# Patient Record
Sex: FEMALE | Race: WHITE
[De-identification: names, ages, dates, MRNs, and addresses within clinical notes are randomized per-mention and may not be internally consistent; named-entity substitution may affect disease eponyms.]

---

## 2019-10-05 ENCOUNTER — HOSPITAL ENCOUNTER (INPATIENT)
Dept: HOSPITAL 74 - JER | Age: 44
LOS: 1 days | Discharge: LEFT BEFORE DISCHARGE | DRG: 918 | End: 2019-10-06
Attending: INTERNAL MEDICINE | Admitting: INTERNAL MEDICINE
Payer: COMMERCIAL

## 2019-10-05 VITALS — BODY MASS INDEX: 20.9 KG/M2

## 2019-10-05 DIAGNOSIS — E87.1: ICD-10-CM

## 2019-10-05 DIAGNOSIS — T43.294A: Primary | ICD-10-CM

## 2019-10-05 DIAGNOSIS — F32.9: ICD-10-CM

## 2019-10-05 DIAGNOSIS — R51: ICD-10-CM

## 2019-10-05 DIAGNOSIS — R55: ICD-10-CM

## 2019-10-05 DIAGNOSIS — Z95.810: ICD-10-CM

## 2019-10-05 DIAGNOSIS — M19.90: ICD-10-CM

## 2019-10-05 DIAGNOSIS — R56.9: ICD-10-CM

## 2019-10-05 DIAGNOSIS — I45.81: ICD-10-CM

## 2019-10-05 DIAGNOSIS — I49.9: ICD-10-CM

## 2019-10-05 LAB
ALBUMIN SERPL-MCNC: 3.6 G/DL (ref 3.4–5)
ALP SERPL-CCNC: 117 U/L (ref 45–117)
ALT SERPL-CCNC: 13 U/L (ref 13–61)
AMPHET UR-MCNC: NEGATIVE NG/ML
ANION GAP SERPL CALC-SCNC: 7 MMOL/L (ref 8–16)
AST SERPL-CCNC: 18 U/L (ref 15–37)
BARBITURATES UR-MCNC: NEGATIVE NG/ML
BASOPHILS # BLD: 0.5 % (ref 0–2)
BENZODIAZ UR SCN-MCNC: NEGATIVE NG/ML
BILIRUB SERPL-MCNC: 0.2 MG/DL (ref 0.2–1)
BUN SERPL-MCNC: 11.8 MG/DL (ref 7–18)
CALCIUM SERPL-MCNC: 7.9 MG/DL (ref 8.5–10.1)
CHLORIDE SERPL-SCNC: 99 MMOL/L (ref 98–107)
CO2 SERPL-SCNC: 28 MMOL/L (ref 21–32)
COCAINE UR-MCNC: NEGATIVE NG/ML
CREAT SERPL-MCNC: 0.8 MG/DL (ref 0.55–1.3)
DEPRECATED RDW RBC AUTO: 13.4 % (ref 11.6–15.6)
EOSINOPHIL # BLD: 0 % (ref 0–4.5)
GLUCOSE SERPL-MCNC: 121 MG/DL (ref 74–106)
HCT VFR BLD CALC: 32.5 % (ref 32.4–45.2)
HGB BLD-MCNC: 11 GM/DL (ref 10.7–15.3)
INR BLD: 0.98 (ref 0.83–1.09)
LYMPHOCYTES # BLD: 13.9 % (ref 8–40)
MAGNESIUM SERPL-MCNC: 2.1 MG/DL (ref 1.8–2.4)
MCH RBC QN AUTO: 30 PG (ref 25.7–33.7)
MCHC RBC AUTO-ENTMCNC: 33.9 G/DL (ref 32–36)
MCV RBC: 88.4 FL (ref 80–96)
METHADONE UR-MCNC: NEGATIVE NG/ML
MONOCYTES # BLD AUTO: 6.7 % (ref 3.8–10.2)
NEUTROPHILS # BLD: 78.9 % (ref 42.8–82.8)
OPIATES UR QL SCN: NEGATIVE NG/ML
PCP UR QL SCN: NEGATIVE NG/ML
PH UR: 7 [PH] (ref 5–8)
PLATELET # BLD AUTO: 238 K/MM3 (ref 134–434)
PMV BLD: 8 FL (ref 7.5–11.1)
POTASSIUM SERPLBLD-SCNC: 4.1 MMOL/L (ref 3.5–5.1)
PROT SERPL-MCNC: 6.6 G/DL (ref 6.4–8.2)
PT PNL PPP: 11.6 SEC (ref 9.7–13)
RBC # BLD AUTO: 3.68 M/MM3 (ref 3.6–5.2)
SODIUM SERPL-SCNC: 134 MMOL/L (ref 136–145)
SP GR UR: 1.02 (ref 1.01–1.03)
UROBILINOGEN UR STRIP-MCNC: 0.2 MG/DL (ref 0.2–1)
WBC # BLD AUTO: 5.8 K/MM3 (ref 4–10)

## 2019-10-05 PROCEDURE — G0378 HOSPITAL OBSERVATION PER HR: HCPCS

## 2019-10-05 NOTE — PDOC
*Physical Exam





- Vital Signs


 Last Vital Signs











Temp Pulse Resp BP Pulse Ox


 


 98.1 F   86   18   134/85   99 


 


 10/05/19 22:09  10/05/19 22:09  10/05/19 22:09  10/05/19 22:09  10/05/19 22:09














ED Treatment Course





- LABORATORY


CBC & Chemistry Diagram: 


 10/05/19 22:15





 10/05/19 22:15





- ADDITIONAL ORDERS


Additional order review: 


 Laboratory  Results











  10/05/19





  22:03


 


POC Glucometer  117








 











  10/05/19





  22:03


 


POC Glucometer  117














- RADIOLOGY


Radiology Studies Ordered: 














 Category Date Time Status


 


 HEAD CT WITHOUT CONTRAST [CT] Stat CT Scan  10/05/19 22:34 Ordered














Medical Decision Making





- Medical Decision Making





10/05/19 22:36


Patient seen and evaluated as pre-attending with Dr. Isaacs (PGY-2) and Dr. Gold (Attending)





44 y/o female with a PMHx of Long QT syndrome (s/p AICD 4 years previous) BIBEM 

after syncopal vs. seizure.  Patient has limited recall of what occured but 

states states she was feeling lightheaded this morning and reports she saw an 

animal with a long tail and a bird in her bedroom.  She next remembers EMS in 

her house. Patient reports she took 4 Wellbutrin pills today to increase her 

energy in addition to her daily home medications of Prozac and Metoprolol.  

Cannot recall the name of the doctor who prescribed her Prozac and Metoprolol 

and states she received her Wellbutrin following an online consultation with a 

doctor.  Subsequently states she receives Metoprolol from a friend.  Does not 

take Wellbutrin daily but PRN for increased energy.  Mother reports that 

patient was c/o being dry all day and then suddenly fell to the ground with 

tonic clonic movements of her UE. 





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  Nursing staff provides 

additional information that patient used to work @ [facility] ICU however was 

terminated for stealing medication. 





VS unremarkable, has facial and UE fasciculation, appears older than stated 

age. 


Concern for cardiac event vs. drug withdrawal seizure


Plan to CT head, interrogate AICD and admit.   





10/05/19 22:53


Call placed to [facility] ED, as per [facility] ED, patient had polymorphic VTach/

Torsades x2 w/AICD ([employer]) placed on 09/11/2015


Patient had initially been evaluated in our ED in 09/04/2015 for VFib w/

subsequent cardiac arrest, EKG showed Prolonged QTc > [facility]





10/06/19 01:13


Patient accepted to hospitalist medicine service for further evaluation.


Clinical Impression: Seizure, likely 2/2 to Buproprion withdrawal





Discharge





- Discharge Information


Problems reviewed: Yes


Clinical Impression/Diagnosis: 


 Syncope and collapse, History of implantable cardioverter-defibrillator (ICD) 

placement, Prolonged QT syndrome





Condition: Stable





- Admission


Yes





- Follow up/Referral





- Patient Discharge Instructions





- Post Discharge Activity

## 2019-10-05 NOTE — PDOC
Attending Attestation





- Resident


Resident Name: Carol Isaacs





- ED Attending Attestation


I have performed the following: I have examined & evaluated the patient, The 

case was reviewed & discussed with the resident, I agree w/resident's findings 

& plan





- HPI


HPI: 





10/05/19 22:39


Pt "passed out" at home; not witnessed by sibs or mom.


Pt has a hx of a defibrillator placed in 2015; pt has no CP at this time


SHe had seizure activity at home. SHe had urinary incontinence.  She takes 

wellbutrin and prozac and she may have caused a decrease in her seizure 

threshold as a result of the wellbutrin





- Physicial Exam


PE: 





10/05/19 23:33


Agree with resident exam.  Pt is shaky; appears to be withdrawing from sedative 

type medication.


Dry mucus menbranes.


Afebrile


Abd soft NT ND


Lungs clear. 


HR S1S2 RRR


Pt is neurologically intact. Answering all questions.


Reflexes equal throughout.





- Medical Decision Making





10/05/19 23:04


Pt's labs are all WNL. Trop is 0.02. CPK is only 100.


Pt may not have had a seizure either.


She is dry mouth and dehydrated and she may have just had a vasovagal syncope.





I called [employer], and they will be calling me back for a 

representative of theirs to query patient's defibrillator.


Her defibrillator is not compatible with the [employer] query sytem thru the 

telephone lines.


10/05/19 23:39


Pt will be admitted to hospitalist to the tele unit





10/06/19 00:10


Patient Name: CHARO CEE


THIS IS A PRELIMINARY REPORT FROM IMAGING ON CALL


DATE OF SERVICE: 2019-10-05 23:33:26


IMAGES: 146


EXAM: HEAD CT WITHOUT CONTRAST


HISTORY: New onset seizure


COMPARISON: None.


FINDINGS:


No acute intracranial abnormalities identified.


No mass.


No obvious infarct.


No hemorrhage.


No hydrocephalus shift or herniation.


Osseous structures are intact.


Advanced mucosal thickening and/or fluid is noted in the bilateral maxillary 

sinuses and in the right


ethmoid sinuses. There is a bubbly pattern in the left maxillary sinus. This 

could be seen with


sinusitis possibly even fungal sinusitis. Recommend evaluation.

## 2019-10-06 VITALS — TEMPERATURE: 98.3 F

## 2019-10-06 VITALS — SYSTOLIC BLOOD PRESSURE: 114 MMHG | DIASTOLIC BLOOD PRESSURE: 70 MMHG | HEART RATE: 78 BPM

## 2019-10-06 LAB
ALBUMIN SERPL-MCNC: 3.1 G/DL (ref 3.4–5)
ALP SERPL-CCNC: 98 U/L (ref 45–117)
ALT SERPL-CCNC: 13 U/L (ref 13–61)
ANION GAP SERPL CALC-SCNC: 6 MMOL/L (ref 8–16)
AST SERPL-CCNC: 13 U/L (ref 15–37)
BASOPHILS # BLD: 0.4 % (ref 0–2)
BILIRUB SERPL-MCNC: 0.4 MG/DL (ref 0.2–1)
BUN SERPL-MCNC: 7.9 MG/DL (ref 7–18)
CALCIUM SERPL-MCNC: 7.9 MG/DL (ref 8.5–10.1)
CHLORIDE SERPL-SCNC: 105 MMOL/L (ref 98–107)
CO2 SERPL-SCNC: 28 MMOL/L (ref 21–32)
CREAT SERPL-MCNC: 0.7 MG/DL (ref 0.55–1.3)
DEPRECATED RDW RBC AUTO: 13.7 % (ref 11.6–15.6)
EOSINOPHIL # BLD: 0 % (ref 0–4.5)
GLUCOSE SERPL-MCNC: 100 MG/DL (ref 74–106)
HCT VFR BLD CALC: 31.1 % (ref 32.4–45.2)
HGB BLD-MCNC: 10.5 GM/DL (ref 10.7–15.3)
LYMPHOCYTES # BLD: 26.1 % (ref 8–40)
MAGNESIUM SERPL-MCNC: 2.1 MG/DL (ref 1.8–2.4)
MCH RBC QN AUTO: 30 PG (ref 25.7–33.7)
MCHC RBC AUTO-ENTMCNC: 33.8 G/DL (ref 32–36)
MCV RBC: 88.7 FL (ref 80–96)
MONOCYTES # BLD AUTO: 10.2 % (ref 3.8–10.2)
NEUTROPHILS # BLD: 63.3 % (ref 42.8–82.8)
PHOSPHATE SERPL-MCNC: 3.6 MG/DL (ref 2.5–4.9)
PLATELET # BLD AUTO: 196 K/MM3 (ref 134–434)
PMV BLD: 8 FL (ref 7.5–11.1)
POTASSIUM SERPLBLD-SCNC: 4.3 MMOL/L (ref 3.5–5.1)
PROT SERPL-MCNC: 5.8 G/DL (ref 6.4–8.2)
RBC # BLD AUTO: 3.5 M/MM3 (ref 3.6–5.2)
SODIUM SERPL-SCNC: 139 MMOL/L (ref 136–145)
WBC # BLD AUTO: 4.6 K/MM3 (ref 4–10)

## 2019-10-06 NOTE — HP
CHIEF COMPLAINT: Seizure





PCP: needs referral


Cardiology: [facility]





HISTORY OF PRESENT ILLNESS: Pt is a 42 yo F, with PMH of prolonged QT (AICD- 

[facility] had polymorphic vtac and torsades, post V fib arrest 2015), who is 

presenting via EMS from home after a seizure episode. The patient does not 

remember the event, but she relayed the story as it was told to her by her 

family members. Patient's family reports she fell to the ground and had 1-2 

minutes of generalized shaking, LOC, and urinary incontinence followed by a 

short period of confusion. Pt has never had a seizure, but states she has been 

taking "about 4 doses of wellbutrin and prozac for an energy boost today," 

which she has been buying online. Pt has not been compliant with her heart 

medications ("taking toprol from a friend") and has not seen cardiology since 

her AICD placement 4 years ago. Pt denies any recent fevers/chills, headache, 

vision changes, chest pain, palpitations, SOB, nausea/vomiting, abdominal pain, 

urinary symptoms, diarrhea/constipation, or leg swelling.








ER course was notable for:


(1)ECG: NSR, prolonged QTc (HR 86, , , QTc 528). No TWIs or 

significant ST segment changes. Prolonged QTc from prior (499, done 9/15/2015)


(2)UA negative for infection. Utox positive for MDMA


(3)Providing 1 g keppra loading dose for concern of long keppra half life; 

concern for possible continued seizure activity. 





In ED: [employer] was called and stated they will send results of AICD 

activity in the morning as the device is not compatible with our interrogation 

system. 





Recent Travel: denies





PAST MEDICAL HISTORY: prolonged QT s/p AICD  ([facility] had polymorphic vtac 

and torsades) post V fib arrest 2015





PAST SURGICAL HISTORY: AICD placement in 2015





Social History:


Smoking: former smoker, wellbutrin to quit now only smokes cigarette 

occasionally (quit 4mo ago). 


Alcohol: denies


Drugs: denies





Allergies





No Known Allergies Allergy (Verified 10/06/19 01:50)


 








HOME MEDICATIONS:


 Home Medications











 Medication  Instructions  Recorded


 


Bupropion HCl [Bupropion Xl] 150 mg PO ONCE 10/06/19


 


Fluoxetine HCl [Prozac] 40 mg PO DAILY 10/06/19








REVIEW OF SYSTEMS


CONSTITUTIONAL: malaise


Absent:  fever, chills, diaphoresis, generalized weakness, , loss of appetite, 

weight change


HEENT: 


Absent:  rhinorrhea, nasal congestion, throat pain, throat swelling, difficulty 

swallowing, mouth swelling, ear pain, eye pain, visual changes


CARDIOVASCULAR: 


Absent: chest pain, syncope, palpitations, irregular heart rate, lightheadedness

, peripheral edema


RESPIRATORY: 


Absent: cough, shortness of breath, dyspnea with exertion, orthopnea, wheezing, 

stridor, hemoptysis


GASTROINTESTINAL: 


Absent: abdominal pain, abdominal distension, nausea, vomiting, diarrhea, 

constipation, melena, hematochezia


GENITOURINARY: incontinence


Absent: dysuria, frequency, urgency, hesitancy, hematuria, flank pain, genital 

pain


MUSCULOSKELETAL: 


Absent: myalgia, arthralgia, joint swelling, back pain, neck pain


SKIN: 


Absent: rash, itching, pallor


HEMATOLOGIC/IMMUNOLOGIC: 


Absent: easy bleeding, easy bruising, lymphadenopathy, frequent infections


ENDOCRINE:


Absent: unexplained weight gain, unexplained weight loss, heat intolerance, 

cold intolerance


NEUROLOGIC: 


Absent: headache, focal weakness or paresthesias, dizziness, unsteady gait, 

seizure, mental status changes, bladder or bowel incontinence


PSYCHIATRIC: 


Absent: anxiety, depression, suicidal or homicidal ideation, hallucinations.








PHYSICAL EXAMINATION


 Vital Signs - 24 hr











  10/05/19 10/06/19 10/06/19





  22:09 03:06 06:11


 


Temperature 98.1 F  


 


Pulse Rate 86  62


 


Respiratory 18  20





Rate   


 


Blood Pressure 134/85  99/56 L


 


O2 Sat by Pulse 99 96 





Oximetry (%)   











GENERAL: Awake, alert, and fully oriented, in no acute distress.


HEAD: Normal with no signs of trauma.


EYES: Pupils equal, round and reactive to light, extraocular movements intact, 

sclera anicteric, conjunctiva clear. No lid lag. Horizontal nystagmus with 

beats to right when looking left. 


EARS, NOSE, THROAT: Ears normal, nares patent, oropharynx clear without 

exudates. Dry mucous membranes, poor dentition with multiple cavities and 

missing teeth towards the back, did NOT bite tongue


NECK: Normal range of motion, supple without lymphadenopathy, JVD, or masses.


LUNGS: Breath sounds equal, clear to auscultation bilaterally. No wheezes, and 

no crackles. No accessory muscle use.


HEART: Regular rate and rhythm, normal S1 and S2 without murmur, rub or gallop. 

L side AICD in place. 


ABDOMEN: Soft, nontender, not distended, normoactive bowel sounds, no guarding, 

no rebound, no masses.  No hepatomegaly or  splenomegaly. 


MUSCULOSKELETAL: Normal range of motion at all joints. No bony deformities or 

tenderness. No CVA tenderness.


UPPER EXTREMITIES: 2+ pulses, warm, well-perfused. No cyanosis. No clubbing. No 

peripheral edema. sensation intact bilaterally, 1+ reflexes


LOWER EXTREMITIES: 2+ pulses, warm, well-perfused. No calf tenderness. No 

peripheral edema. Sensation intact bilaterally, hyperreflexia bilaterally


NEUROLOGICAL:  Cranial nerves II-XII intact. Normal speech. Normal gait.


PSYCHIATRIC: Cooperative. Good eye contact. Appropriate mood and affect.


SKIN: Warm, dry, normal turgor, no rashes or lesions noted, normal capillary 

refill. 





 Laboratory Results - last 24 hr











  10/05/19 10/05/19 10/05/19





  22:03 22:15 22:15


 


WBC    5.8


 


RBC    3.68


 


Hgb    11.0


 


Hct    32.5


 


MCV    88.4


 


MCH    30.0


 


MCHC    33.9


 


RDW    13.4


 


Plt Count    238  D


 


MPV    8.0


 


Absolute Neuts (auto)    4.5


 


Neutrophils %    78.9


 


Lymphocytes %    13.9  D


 


Monocytes %    6.7


 


Eosinophils %    0.0  D


 


Basophils %    0.5


 


Nucleated RBC %    0


 


PT with INR   


 


INR   


 


Sodium   


 


Potassium   


 


Chloride   


 


Carbon Dioxide   


 


Anion Gap   


 


BUN   


 


Creatinine   


 


Est GFR (CKD-EPI)AfAm   


 


Est GFR (CKD-EPI)NonAf   


 


POC Glucometer  117  


 


Random Glucose   


 


Calcium   


 


Magnesium   


 


Total Bilirubin   


 


AST   


 


ALT   


 


Alkaline Phosphatase   


 


Creatine Kinase   102 


 


Troponin I   < 0.02 


 


Total Protein   


 


Albumin   


 


Serum Pregnancy, Qual   


 


Urine Color   


 


Urine Appearance   


 


Urine pH   


 


Ur Specific Gravity   


 


Urine Protein   


 


Urine Glucose (UA)   


 


Urine Ketones   


 


Urine Blood   


 


Urine Nitrite   


 


Urine Bilirubin   


 


Urine Urobilinogen   


 


Ur Leukocyte Esterase   


 


Salicylates   


 


Opiates Screen   


 


Methadone Screen   


 


Acetaminophen   


 


Barbiturate Screen   


 


Phencyclidine Screen   


 


Ur Amphetamines Screen   


 


MDMA (Ecstasy) Screen   


 


Benzodiazepines Screen   


 


Cocaine Screen   


 


U Marijuana (THC) Screen   


 


Alcohol, Quantitative   














  10/05/19 10/05/19 10/05/19





  22:15 22:15 22:15


 


WBC   


 


RBC   


 


Hgb   


 


Hct   


 


MCV   


 


MCH   


 


MCHC   


 


RDW   


 


Plt Count   


 


MPV   


 


Absolute Neuts (auto)   


 


Neutrophils %   


 


Lymphocytes %   


 


Monocytes %   


 


Eosinophils %   


 


Basophils %   


 


Nucleated RBC %   


 


PT with INR   11.60 


 


INR   0.98 


 


Sodium  134 L  


 


Potassium  4.1  


 


Chloride  99  


 


Carbon Dioxide  28  


 


Anion Gap  7 L  


 


BUN  11.8  


 


Creatinine  0.8  


 


Est GFR (CKD-EPI)AfAm  106.13  


 


Est GFR (CKD-EPI)NonAf  91.57  


 


POC Glucometer   


 


Random Glucose  121 H  


 


Calcium  7.9 L  


 


Magnesium  2.1  


 


Total Bilirubin  0.2  


 


AST  18  


 


ALT  13  


 


Alkaline Phosphatase  117  


 


Creatine Kinase   


 


Troponin I   


 


Total Protein  6.6  


 


Albumin  3.6  


 


Serum Pregnancy, Qual    Negative


 


Urine Color   


 


Urine Appearance   


 


Urine pH   


 


Ur Specific Gravity   


 


Urine Protein   


 


Urine Glucose (UA)   


 


Urine Ketones   


 


Urine Blood   


 


Urine Nitrite   


 


Urine Bilirubin   


 


Urine Urobilinogen   


 


Ur Leukocyte Esterase   


 


Salicylates   


 


Opiates Screen   


 


Methadone Screen   


 


Acetaminophen   


 


Barbiturate Screen   


 


Phencyclidine Screen   


 


Ur Amphetamines Screen   


 


MDMA (Ecstasy) Screen   


 


Benzodiazepines Screen   


 


Cocaine Screen   


 


U Marijuana (THC) Screen   


 


Alcohol, Quantitative   














  10/05/19 10/05/19 10/06/19





  22:55 22:55 01:05


 


WBC   


 


RBC   


 


Hgb   


 


Hct   


 


MCV   


 


MCH   


 


MCHC   


 


RDW   


 


Plt Count   


 


MPV   


 


Absolute Neuts (auto)   


 


Neutrophils %   


 


Lymphocytes %   


 


Monocytes %   


 


Eosinophils %   


 


Basophils %   


 


Nucleated RBC %   


 


PT with INR   


 


INR   


 


Sodium   


 


Potassium   


 


Chloride   


 


Carbon Dioxide   


 


Anion Gap   


 


BUN   


 


Creatinine   


 


Est GFR (CKD-EPI)AfAm   


 


Est GFR (CKD-EPI)NonAf   


 


POC Glucometer   


 


Random Glucose   


 


Calcium   


 


Magnesium   


 


Total Bilirubin   


 


AST   


 


ALT   


 


Alkaline Phosphatase   


 


Creatine Kinase   


 


Troponin I   


 


Total Protein   


 


Albumin   


 


Serum Pregnancy, Qual   


 


Urine Color   Yellow 


 


Urine Appearance   Clear 


 


Urine pH   7.0 


 


Ur Specific Gravity   1.020 


 


Urine Protein   Negative 


 


Urine Glucose (UA)   Negative 


 


Urine Ketones   Negative 


 


Urine Blood   Negative 


 


Urine Nitrite   Negative 


 


Urine Bilirubin   Negative 


 


Urine Urobilinogen   0.2 


 


Ur Leukocyte Esterase   Trace 


 


Salicylates    < 1.7 L


 


Opiates Screen  Negative  


 


Methadone Screen  Negative  


 


Acetaminophen   


 


Barbiturate Screen  Negative  


 


Phencyclidine Screen  Negative  


 


Ur Amphetamines Screen  Negative  


 


MDMA (Ecstasy) Screen  Positive A*  


 


Benzodiazepines Screen  Negative  


 


Cocaine Screen  Negative  


 


U Marijuana (THC) Screen  Negative  


 


Alcohol, Quantitative   














  10/06/19 10/06/19





  01:05 01:05


 


WBC  


 


RBC  


 


Hgb  


 


Hct  


 


MCV  


 


MCH  


 


MCHC  


 


RDW  


 


Plt Count  


 


MPV  


 


Absolute Neuts (auto)  


 


Neutrophils %  


 


Lymphocytes %  


 


Monocytes %  


 


Eosinophils %  


 


Basophils %  


 


Nucleated RBC %  


 


PT with INR  


 


INR  


 


Sodium  


 


Potassium  


 


Chloride  


 


Carbon Dioxide  


 


Anion Gap  


 


BUN  


 


Creatinine  


 


Est GFR (CKD-EPI)AfAm  


 


Est GFR (CKD-EPI)NonAf  


 


POC Glucometer  


 


Random Glucose  


 


Calcium  


 


Magnesium  


 


Total Bilirubin  


 


AST  


 


ALT  


 


Alkaline Phosphatase  


 


Creatine Kinase  


 


Troponin I  


 


Total Protein  


 


Albumin  


 


Serum Pregnancy, Qual  


 


Urine Color  


 


Urine Appearance  


 


Urine pH  


 


Ur Specific Gravity  


 


Urine Protein  


 


Urine Glucose (UA)  


 


Urine Ketones  


 


Urine Blood  


 


Urine Nitrite  


 


Urine Bilirubin  


 


Urine Urobilinogen  


 


Ur Leukocyte Esterase  


 


Salicylates  


 


Opiates Screen  


 


Methadone Screen  


 


Acetaminophen  <2.0 


 


Barbiturate Screen  


 


Phencyclidine Screen  


 


Ur Amphetamines Screen  


 


MDMA (Ecstasy) Screen  


 


Benzodiazepines Screen  


 


Cocaine Screen  


 


U Marijuana (THC) Screen  


 


Alcohol, Quantitative   < 3.0








Imaging:


CT head negative for masses, bleeds.


Chest x-ray clear with no infiltrates or cardiomegaly





ASSESSMENT/PLAN:


Pt is a 42 yo F, with PMH of prolonged QT (AICD- [facility] had polymorphic vtac 

and torsades, post V fib arrest 2015), who is presenting via EMS from home 

after a seizure episode. 





#Seizure


-telemetry 


- patient counselled on the dangers of ordering medications from the internet, 

or taking medicines that belong to friends. Counseled on avoiding drugs like 

MDMA.


-avoid welbutrin 


-monitor electrolytes 


-iv fluid hdyration 


-neuro consult





#Prolonged qtc syndrome s/p aicd - noncompliant with follow up care 


-cardiology consult for aicd interrogation 


-avoid qtc prolonging agents 





#Hyponatremia


-iv fluid hydration 


-repeat chemistry 





dvt -scds   








Visit type





- Emergency Visit


Emergency Visit: Yes


ED Registration Date: 10/06/19


Care time: The patient presented to the Emergency Department on the above date 

and was hospitalized for further evaluation of their emergent condition.





- New Patient


This patient is new to me today: Yes


Date on this admission: 10/06/19





- Critical Care


Critical Care patient: No





ATTENDING PHYSICIAN STATEMENT





I saw and evaluated the patient.


I reviewed the resident's note and discussed the case with the resident.


I agree with the resident's findings and plan as documented.








SUBJECTIVE:








OBJECTIVE:








ASSESSMENT AND PLAN:

## 2019-10-06 NOTE — PN
Teaching Attending Note


Name of Resident: Jihan Olivarez





ATTENDING PHYSICIAN STATEMENT





I saw and evaluated the patient.


I reviewed the resident's note and discussed the case with the resident.


I agree with the resident's findings and plan as documented.








SUBJECTIVE:


42yo woman with long qt syndrome s/p cardiac arrest and aicd placement in 2015, 

no cardiology f/u has been taking prozac and recently started taking Welbutrin 

to help her quit smoking. She reported taking 3 tabs of 150mg Welbutrin 

yesterday, along with her Prozac. She  had aura of animals in her room, LOC 

around evening time, woke up confused, having urinated herself. Denied any 

headache. Unspecified time LOC. She denied taking ecstasy although her utox was 

positive for it. 





OBJECTIVE:


 Last Vital Signs











Temp Pulse Resp BP Pulse Ox


 


 98.1 F   86   18   134/85   99 


 


 10/05/19 22:09  10/05/19 22:09  10/05/19 22:09  10/05/19 22:09  10/05/19 22:09





gen -nad, aaox3 


heent  -atraumatic, perrla , no tongue biting 


neck supple 


cv-s1+s2+ rrr


chest left sided aicd in place 


ext - no pedal edema 





 Abnormal Lab Results











  10/05/19 10/05/19





  22:15 22:55


 


Sodium  134 L 


 


Anion Gap  7 L 


 


Random Glucose  121 H 


 


Calcium  7.9 L 


 


MDMA (Ecstasy) Screen   Positive A*








ekg showed long qtc 


ct- no acute insults 








ASSESSMENT AND PLAN:


#S/p seizure episode x1, likely induced by high dose welbutrin + ecstasy. First 

time seizure episode. head ct wnl. 


-telemetry 


-avoid ecstasy 


-avoid welbutrin 


-monitor electrolytes 


-iv fluid hdyration 


-neuro eval 





#Prolonged qtc syndrome s/p aicd - noncompliant w/ f/u 


-cardiology consult for aicd interrogation 


-avoid qtc prolonging agents 





#Hyponatremia- may be secondary to ecstasy , siadh 


-iv fluid hydration 


-repeat chemistry 





dvt -scds

## 2019-10-06 NOTE — EKG
Test Reason : 

Blood Pressure : ***/*** mmHG

Vent. Rate : 064 BPM     Atrial Rate : 064 BPM

   P-R Int : 168 ms          QRS Dur : 092 ms

    QT Int : 502 ms       P-R-T Axes : 054 059 052 degrees

   QTc Int : 517 ms

 

NORMAL SINUS RHYTHM

PROLONGED QT

ABNORMAL ECG

WHEN COMPARED WITH ECG OF 05-OCT-2019 22:11,

T WAVE INVERSION NO LONGER EVIDENT IN ANTERIOR LEADS

Confirmed by JOSEPHINE DURAN, KATHI ([id number]) on 10/6/2019 11:26:43 AM

 

Referred By: MARIAMA SMITH           Confirmed By:KATHI BURTON MD

## 2019-10-06 NOTE — PN
Progress Note (short form)





- Note


Progress Note: 





Subjective:


no fever or chills. denies any CP or SOB. denies dizziness. she reports feeling 

dizzy ( can't specify ) before her LOC. Also, she saw a bird that no one else 

saw before she passed out. she woke up wet. not clear of duration of LOC. 

denies taking Ecstasy but thinks the positive urine is frm a tea that she takes 

( [place] she does not  provide the name of the tea  








Objective:








Vital Signs:





 Last Vital Signs











Temp Pulse Resp BP Pulse Ox


 


 98.1 F   62   20   99/56 L  96 


 


 10/05/19 22:09  10/06/19 06:11  10/06/19 06:11  10/06/19 06:11  10/06/19 03:06








 Laboratory Results - last 24 hr











  10/05/19 10/05/19 10/05/19





  22:03 22:15 22:15


 


WBC    5.8


 


RBC    3.68


 


Hgb    11.0


 


Hct    32.5


 


MCV    88.4


 


MCH    30.0


 


MCHC    33.9


 


RDW    13.4


 


Plt Count    238  D


 


MPV    8.0


 


Absolute Neuts (auto)    4.5


 


Neutrophils %    78.9


 


Lymphocytes %    13.9  D


 


Monocytes %    6.7


 


Eosinophils %    0.0  D


 


Basophils %    0.5


 


Nucleated RBC %    0


 


PT with INR   


 


INR   


 


Sodium   


 


Potassium   


 


Chloride   


 


Carbon Dioxide   


 


Anion Gap   


 


BUN   


 


Creatinine   


 


Est GFR (CKD-EPI)AfAm   


 


Est GFR (CKD-EPI)NonAf   


 


POC Glucometer  117  


 


Random Glucose   


 


Calcium   


 


Magnesium   


 


Total Bilirubin   


 


AST   


 


ALT   


 


Alkaline Phosphatase   


 


Creatine Kinase   102 


 


Troponin I   < 0.02 


 


Total Protein   


 


Albumin   


 


Serum Pregnancy, Qual   


 


Urine Color   


 


Urine Appearance   


 


Urine pH   


 


Ur Specific Gravity   


 


Urine Protein   


 


Urine Glucose (UA)   


 


Urine Ketones   


 


Urine Blood   


 


Urine Nitrite   


 


Urine Bilirubin   


 


Urine Urobilinogen   


 


Ur Leukocyte Esterase   


 


Salicylates   


 


Opiates Screen   


 


Methadone Screen   


 


Acetaminophen   


 


Barbiturate Screen   


 


Phencyclidine Screen   


 


Ur Amphetamines Screen   


 


MDMA (Ecstasy) Screen   


 


Benzodiazepines Screen   


 


Cocaine Screen   


 


U Marijuana (THC) Screen   


 


Alcohol, Quantitative   














  10/05/19 10/05/19 10/05/19





  22:15 22:15 22:15


 


WBC   


 


RBC   


 


Hgb   


 


Hct   


 


MCV   


 


MCH   


 


MCHC   


 


RDW   


 


Plt Count   


 


MPV   


 


Absolute Neuts (auto)   


 


Neutrophils %   


 


Lymphocytes %   


 


Monocytes %   


 


Eosinophils %   


 


Basophils %   


 


Nucleated RBC %   


 


PT with INR   11.60 


 


INR   0.98 


 


Sodium  134 L  


 


Potassium  4.1  


 


Chloride  99  


 


Carbon Dioxide  28  


 


Anion Gap  7 L  


 


BUN  11.8  


 


Creatinine  0.8  


 


Est GFR (CKD-EPI)AfAm  106.13  


 


Est GFR (CKD-EPI)NonAf  91.57  


 


POC Glucometer   


 


Random Glucose  121 H  


 


Calcium  7.9 L  


 


Magnesium  2.1  


 


Total Bilirubin  0.2  


 


AST  18  


 


ALT  13  


 


Alkaline Phosphatase  117  


 


Creatine Kinase   


 


Troponin I   


 


Total Protein  6.6  


 


Albumin  3.6  


 


Serum Pregnancy, Qual    Negative


 


Urine Color   


 


Urine Appearance   


 


Urine pH   


 


Ur Specific Gravity   


 


Urine Protein   


 


Urine Glucose (UA)   


 


Urine Ketones   


 


Urine Blood   


 


Urine Nitrite   


 


Urine Bilirubin   


 


Urine Urobilinogen   


 


Ur Leukocyte Esterase   


 


Salicylates   


 


Opiates Screen   


 


Methadone Screen   


 


Acetaminophen   


 


Barbiturate Screen   


 


Phencyclidine Screen   


 


Ur Amphetamines Screen   


 


MDMA (Ecstasy) Screen   


 


Benzodiazepines Screen   


 


Cocaine Screen   


 


U Marijuana (THC) Screen   


 


Alcohol, Quantitative   














  10/05/19 10/05/19 10/06/19





  22:55 22:55 01:05


 


WBC   


 


RBC   


 


Hgb   


 


Hct   


 


MCV   


 


MCH   


 


MCHC   


 


RDW   


 


Plt Count   


 


MPV   


 


Absolute Neuts (auto)   


 


Neutrophils %   


 


Lymphocytes %   


 


Monocytes %   


 


Eosinophils %   


 


Basophils %   


 


Nucleated RBC %   


 


PT with INR   


 


INR   


 


Sodium   


 


Potassium   


 


Chloride   


 


Carbon Dioxide   


 


Anion Gap   


 


BUN   


 


Creatinine   


 


Est GFR (CKD-EPI)AfAm   


 


Est GFR (CKD-EPI)NonAf   


 


POC Glucometer   


 


Random Glucose   


 


Calcium   


 


Magnesium   


 


Total Bilirubin   


 


AST   


 


ALT   


 


Alkaline Phosphatase   


 


Creatine Kinase   


 


Troponin I   


 


Total Protein   


 


Albumin   


 


Serum Pregnancy, Qual   


 


Urine Color   Yellow 


 


Urine Appearance   Clear 


 


Urine pH   7.0 


 


Ur Specific Gravity   1.020 


 


Urine Protein   Negative 


 


Urine Glucose (UA)   Negative 


 


Urine Ketones   Negative 


 


Urine Blood   Negative 


 


Urine Nitrite   Negative 


 


Urine Bilirubin   Negative 


 


Urine Urobilinogen   0.2 


 


Ur Leukocyte Esterase   Trace 


 


Salicylates    < 1.7 L


 


Opiates Screen  Negative  


 


Methadone Screen  Negative  


 


Acetaminophen   


 


Barbiturate Screen  Negative  


 


Phencyclidine Screen  Negative  


 


Ur Amphetamines Screen  Negative  


 


MDMA (Ecstasy) Screen  Positive A*  


 


Benzodiazepines Screen  Negative  


 


Cocaine Screen  Negative  


 


U Marijuana (THC) Screen  Negative  


 


Alcohol, Quantitative   














  10/06/19 10/06/19





  01:05 01:05


 


WBC  


 


RBC  


 


Hgb  


 


Hct  


 


MCV  


 


MCH  


 


MCHC  


 


RDW  


 


Plt Count  


 


MPV  


 


Absolute Neuts (auto)  


 


Neutrophils %  


 


Lymphocytes %  


 


Monocytes %  


 


Eosinophils %  


 


Basophils %  


 


Nucleated RBC %  


 


PT with INR  


 


INR  


 


Sodium  


 


Potassium  


 


Chloride  


 


Carbon Dioxide  


 


Anion Gap  


 


BUN  


 


Creatinine  


 


Est GFR (CKD-EPI)AfAm  


 


Est GFR (CKD-EPI)NonAf  


 


POC Glucometer  


 


Random Glucose  


 


Calcium  


 


Magnesium  


 


Total Bilirubin  


 


AST  


 


ALT  


 


Alkaline Phosphatase  


 


Creatine Kinase  


 


Troponin I  


 


Total Protein  


 


Albumin  


 


Serum Pregnancy, Qual  


 


Urine Color  


 


Urine Appearance  


 


Urine pH  


 


Ur Specific Gravity  


 


Urine Protein  


 


Urine Glucose (UA)  


 


Urine Ketones  


 


Urine Blood  


 


Urine Nitrite  


 


Urine Bilirubin  


 


Urine Urobilinogen  


 


Ur Leukocyte Esterase  


 


Salicylates  


 


Opiates Screen  


 


Methadone Screen  


 


Acetaminophen  <2.0 


 


Barbiturate Screen  


 


Phencyclidine Screen  


 


Ur Amphetamines Screen  


 


MDMA (Ecstasy) Screen  


 


Benzodiazepines Screen  


 


Cocaine Screen  


 


U Marijuana (THC) Screen  


 


Alcohol, Quantitative   < 3.0











Physical Exam:


NAD, awake, alert. cooperative. 


MMM, no LAP in neck . slightly ertyhematous oropharynx. no edema in uvula. 


CV: RRR, no MRG 


LUngs: CTAB


Abd: soft, NT, ND , NL BS . reducible small umbilical hernia ( 1 cm ) 


Ext: No edema or erythema, no track marks. DP and RP 2+ b/l. no rash.


AICS felt under the skin in L lower posterior axillary area 





Imaging:


Cxray  reviewed. CT head report pending read 


EKG: sinus , QTC. 500, Nl Axis, No ST or TW changes 





Assessment/Plan:





42 y/o lady with h/o  depression, Long QTC, s/p V Fib arrest in 2015. s/p AICD 

placement, who presented with LOC. 





1- LOC: suspected seizure activity ( aura, urinary incontinence, LOC  in 

setting of positive ecstasy in urine and wellbutrin over dose). No reported 

shocks per patient before  LOC. still need to r/o  arrhythmias. 


- Nl neuro exam.


- obtain orthostatic VS  


- hold off any AED 


- tele monitoring ( no events over night ) 


- ICD interrogation: d/w Tech, No shocks  since placement 


- IVF. 


- order EEG 


- neuro eval. ? MRI  





2- + Ecstasy in urine: patient denies use. Literature searched. Wellbutrin and 

prozac can cause false positive esctasy testing.  


- Monitor on tele 


- no LFTs abnormalities, No tachycardia, no severe HTN. 


- Sodium is slightly low. Ecstacy use can cause hyponatremia. monitor 





3- Wellbutrin over dose: she does not remember the time of ingestion ( in 

evening last night ) . doubt that seizures were caused by this ingestion. 


- I called poison control center. 24 hour tele from last ingestion recommended 

. 


- activated charcoal x 1 is recommended ( 1g/kg)


- will check CMP and electrolytes this am 





4- H/o depression. 


- was started on prozac a month ago. will hold todays dose, and probably cont 

tomorrow 


- will dc wellbutrin ( for smoking) 








5- DVT PX: lovenox 





Visit type





- Emergency Visit


Emergency Visit: Yes


ED Registration Date: 10/06/19


Care time: The patient presented to the Emergency Department on the above date 

and was hospitalized for further evaluation of their emergent condition.





- New Patient


This patient is new to me today: Yes


Date on this admission: 10/06/19





- Critical Care


Critical Care patient: No

## 2019-10-06 NOTE — CON.NEURO
Consult


Consult Specialty:: Elvin


Referred by:: ER


Reason for Consultation:: Seizure





- History of Present Illness


History of Present Illness: 





43 years old woman with hx of 


Cardiac Arrhythmia 


OA 


Headache 


Depression 





Came in afetr she had a seizure 


Patient with ext cardiac hx s/po Cardiac arrest 


I came to see [facility] patient to find she was signed out AMA 








- History Source


History Provided By: Medical Record


Limitations to Obtaining History: Uncooperative





- Past Medical History


Cardio/Vascular: Yes: Other (ventricular fibrillation)


...Pregnant: No





- Past Surgical History


Past Surgical History: Yes: AICD





- Alcohol/Substance Use


Hx Alcohol Use: No


History of Substance Use: reports: None





- Smoking History


Smoking history: Former smoker


Have you smoked in the past 12 months: No


Aproximately how many cigarettes per day: 1


If you are a former smoker, when did you quit?: 6 years ago





- Social History


ADL: Independent


History of Recent Travel: No





Home Medications





- Allergies


Allergies/Adverse Reactions: 


 Allergies











Allergy/AdvReac Type Severity Reaction Status Date / Time


 


No Known Allergies Allergy   Verified 10/06/19 01:50














- Home Medications


Home Medications: 


Ambulatory Orders





Fluoxetine HCl [Prozac] 40 mg PO DAILY 10/06/19 











Family Medical History


Family History: Unable to Obtain





Physical Exam-Neuro


Vital Signs: 


 Vital Signs











Temperature  98.3 F   10/06/19 08:31


 


Pulse Rate  78   10/06/19 08:36


 


Respiratory Rate  18   10/06/19 08:31


 


Blood Pressure  114/70   10/06/19 08:36


 


O2 Sat by Pulse Oximetry (%)  97   10/06/19 08:21











Labs: 


 CBC, BMP





 10/06/19 08:50 





 10/06/19 08:50 





 INR, PTT











INR  0.98  (0.83-1.09)   10/05/19  22:15    














Imaging





- Results


Cat Scan: Image Reviewed





Problem List





- Problems


(1) Syncope and collapse


Assessment/Plan: 


?? New onset Seiuzre 


Patient signed out AMA 


Need 


MRI brain with no Rudi 


EEG 


No AEd 


Seizure precaution 


I called [facility] patient cell with no answer 





Thanks 





Bren Oconnor MD 


Code(s): R55 - SYNCOPE AND COLLAPSE

## 2019-10-06 NOTE — EKG
Test Reason : 

Blood Pressure : ***/*** mmHG

Vent. Rate : 086 BPM     Atrial Rate : 086 BPM

   P-R Int : 186 ms          QRS Dur : 110 ms

    QT Int : 442 ms       P-R-T Axes : 059 049 054 degrees

   QTc Int : 528 ms

 

 

NORMAL SINUS RHYTHM

PROLONGED QT

RIGHT BUNDLE BRANCH BLOCK

NONSPECIFIC ST ABNORMALITY

ABNORMAL ECG

 

Confirmed by KATHI BURTON MD ([id number]) on 10/6/2019 11:32:17 AM

 

Referred By:             Confirmed By:KATHI BURTON MD

## 2019-10-07 NOTE — DS
Physical Exam: 


SUBJECTIVE:no fever or chills. denies any CP or SOB. denies dizziness. she 

reports feeling dizzy ( can't specify ) before her LOC. Also, she saw a bird 

that no one else saw before she passed out. she woke up wet. not clear of 

duration of LOC. denies taking Ecstasy but thinks the positive urine is frm a 

tea that she takes ( [place] she does not  provide the name 

of the tea  








OBJECTIVE:


 Last Vital Signs











Temp Pulse Resp BP Pulse Ox


 


 98.3 F   78   18   114/70   97 


 


 10/06/19 08:31  10/06/19 08:36  10/06/19 08:31  10/06/19 08:36  10/06/19 08:21














PHYSICAL EXAM





Physical Exam:


NAD, awake, alert. cooperative. 


MMM, no LAP in neck . slightly ertyhematous oropharynx. no edema in uvula. 


CV: RRR, no MRG 


LUngs: CTAB


Abd: soft, NT, ND , NL BS . reducible small umbilical hernia ( 1 cm ) 


Ext: No edema or erythema, no track marks. DP and RP 2+ b/l. no rash.


AICS felt under the skin in L lower posterior axillary area 











LABS


CBC,CMP











WBC  4.6 K/mm3 (4.0-10.0)   10/06/19  08:50    


 


RBC  3.50 M/mm3 (3.60-5.2)  L  10/06/19  08:50    


 


Hgb  10.5 GM/dL (10.7-15.3)  L  10/06/19  08:50    


 


Hct  31.1 % (32.4-45.2)  L  10/06/19  08:50    


 


MCV  88.7 fl (80-96)   10/06/19  08:50    


 


MCH  30.0 pg (25.7-33.7)   10/06/19  08:50    


 


MCHC  33.8 g/dl (32.0-36.0)   10/06/19  08:50    


 


RDW  13.7 % (11.6-15.6)   10/06/19  08:50    


 


Plt Count  196 K/MM3 (134-434)   10/06/19  08:50    


 


MPV  8.0 fl (7.5-11.1)   10/06/19  08:50    


 


Absolute Neuts (auto)  2.9 K/mm3 (1.5-8.0)   10/06/19  08:50    


 


Neutrophils %  63.3 % (42.8-82.8)   10/06/19  08:50    


 


Lymphocytes %  26.1 % (8-40)  D 10/06/19  08:50    


 


Monocytes %  10.2 % (3.8-10.2)   10/06/19  08:50    


 


Eosinophils %  0.0 % (0-4.5)   10/06/19  08:50    


 


Basophils %  0.4 % (0-2.0)   10/06/19  08:50    


 


Nucleated RBC %  0 % (0-0)   10/06/19  08:50    


 


Sodium  139 mmol/L (136-145)   10/06/19  08:50    


 


Potassium  4.3 mmol/L (3.5-5.1)   10/06/19  08:50    


 


Chloride  105 mmol/L ()   10/06/19  08:50    


 


Carbon Dioxide  28 mmol/L (21-32)   10/06/19  08:50    


 


Anion Gap  6 MMOL/L (8-16)  L  10/06/19  08:50    


 


BUN  7.9 mg/dL (7-18)   10/06/19  08:50    


 


Creatinine  0.7 mg/dL (0.55-1.3)   10/06/19  08:50    


 


Est GFR (CKD-EPI)AfAm  124.73   10/06/19  08:50    


 


Est GFR (CKD-EPI)NonAf  107.62   10/06/19  08:50    


 


POC Glucometer  117 UNITS ()   10/05/19  22:03    


 


Random Glucose  100 mg/dL ()   10/06/19  08:50    


 


Calcium  7.9 mg/dL (8.5-10.1)  L  10/06/19  08:50    


 


Phosphorus  3.6 mg/dL (2.5-4.9)   10/06/19  08:50    


 


Magnesium  2.1 mg/dL (1.8-2.4)   10/06/19  08:50    


 


Total Bilirubin  0.4 mg/dL (0.2-1)   10/06/19  08:50    


 


AST  13 U/L (15-37)  L  10/06/19  08:50    


 


ALT  13 U/L (13-61)   10/06/19  08:50    


 


Alkaline Phosphatase  98 U/L ()   10/06/19  08:50    


 


Creatine Kinase  102 U/L ()   10/05/19  22:15    


 


Troponin I  < 0.02 ng/ml (0.00-0.05)   10/05/19  22:15    


 


Total Protein  5.8 g/dl (6.4-8.2)  L  10/06/19  08:50    


 


Albumin  3.1 g/dl (3.4-5.0)  L  10/06/19  08:50    


 


Serum Pregnancy, Qual  Negative   10/05/19  22:15    





Imaging:


Cxray  reviewed. CT head report pending read 


EKG: sinus , QTC. 500, Nl Axis, No ST or TW changes 





HOSPITAL COURSE:





Date of Admission:10/06/19





Pt is a 42 yo F, with PMH of prolonged QT (AICD- [facility] had polymorphic vtac 

and torsades, post V fib arrest 2015), who is presenting via EMS from home 

after LOC with symptoms that sounded like a seizure episode (aura, LOC, body 

spasms, urinary incontinence lasting 1-2min followed by state of dze/ confusion 

when the patient came to). Patient admitted to overdosing on wellburtin (which 

she got off an internet consult site) and was also found to have MDMA in her 

urine (though she denied taking it). Her neuro exam was normal and her ICD 

interrogation showed no shocks  since placement. Neuro was consuled and an EEG 

was ordered, the patient was admitted to tele. The patient's labs were 

unremarkable except for slight hyponatremia which may have been due to the 

MDMA. The next morning the patient signed out AMA before neurology could assess 

her. The neurologist attempted to reach the patient by cell phone but was 

unable to contact her. 





Date of Discharge: 10/07/19











Minutes to complete discharge: 40





Discharge Summary


Problems reviewed: Yes


Reason For Visit: LONG QT SYNDROME, SYNCOPE AND COLLAPSE


Condition: Stable





- Instructions


Diet, Activity, Other Instructions: 


- You left against medical advise taking the risks that we talked about


- please stop taking bupropion 


- you can resume your prozac tomorrow 


- I referred you to a heart doctor , Dr. Kim, as you need regualr follow up 

given your cardiac history and AICD placement 


- please follow with neurologist, dr. Nicole , as we have not decided if youhad 

a seizure or not. 


- avoid any drugs and alcohol as well 


- if you have any palpitations, any shortness of breath or hallucinations or if 

your AICD fires, please come back to ER 





- follow up with your pcp in 1-2 days 


 


Referrals: 


Tom Kim MD [Staff Physician] - 1 Week


Narendra Nicole DO [Staff Physician] - 1 Week


Disposition: AGAINST MEDICAL ADVICE





- Home Medications


Comprehensive Discharge Medication List: 


Ambulatory Orders





Fluoxetine HCl [Prozac] 40 mg PO DAILY 10/06/19 








This patient is new to me today: No


Emergency Visit: Yes


ED Registration Date: 10/06/19


Care time: The patient presented to the Emergency Department on the above date 

and was hospitalized for further evaluation of their emergent condition.


Critical Care patient: No





- Discharge Referral


Referred to [facility] No





ATTENDING PHYSICIAN STATEMENT





I saw and evaluated the patient.


I reviewed the resident's note and discussed the case with the resident.


I agree with the resident's findings and plan as documented.








SUBJECTIVE:








OBJECTIVE:








ASSESSMENT AND PLAN:

## 2020-11-24 ENCOUNTER — HOSPITAL ENCOUNTER (INPATIENT)
Dept: HOSPITAL 74 - JER | Age: 45
LOS: 1 days | Discharge: LEFT BEFORE DISCHARGE | DRG: 312 | End: 2020-11-25
Attending: STUDENT IN AN ORGANIZED HEALTH CARE EDUCATION/TRAINING PROGRAM | Admitting: GENERAL ACUTE CARE HOSPITAL
Payer: COMMERCIAL

## 2020-11-24 VITALS — BODY MASS INDEX: 21.6 KG/M2

## 2020-11-24 DIAGNOSIS — Z95.810: ICD-10-CM

## 2020-11-24 DIAGNOSIS — E87.6: ICD-10-CM

## 2020-11-24 DIAGNOSIS — R55: Primary | ICD-10-CM

## 2020-11-24 DIAGNOSIS — F32.9: ICD-10-CM

## 2020-11-24 DIAGNOSIS — I45.81: ICD-10-CM

## 2020-11-24 DIAGNOSIS — Z91.14: ICD-10-CM

## 2020-11-24 DIAGNOSIS — Z53.29: ICD-10-CM

## 2020-11-24 DIAGNOSIS — F17.200: ICD-10-CM

## 2020-11-24 LAB
ALBUMIN SERPL-MCNC: 4 G/DL (ref 3.4–5)
ALP SERPL-CCNC: 78 U/L (ref 45–117)
ALT SERPL-CCNC: 16 U/L (ref 13–61)
ANION GAP SERPL CALC-SCNC: 3 MMOL/L (ref 8–16)
ANION GAP SERPL CALC-SCNC: 8 MMOL/L (ref 8–16)
AST SERPL-CCNC: 15 U/L (ref 15–37)
BASOPHILS # BLD: 0.6 % (ref 0–2)
BILIRUB SERPL-MCNC: 0.4 MG/DL (ref 0.2–1)
BUN SERPL-MCNC: 5.9 MG/DL (ref 7–18)
BUN SERPL-MCNC: 8.3 MG/DL (ref 7–18)
CALCIUM SERPL-MCNC: 8.2 MG/DL (ref 8.5–10.1)
CALCIUM SERPL-MCNC: 8.6 MG/DL (ref 8.5–10.1)
CHLORIDE SERPL-SCNC: 105 MMOL/L (ref 98–107)
CHLORIDE SERPL-SCNC: 107 MMOL/L (ref 98–107)
CO2 SERPL-SCNC: 27 MMOL/L (ref 21–32)
CO2 SERPL-SCNC: 27 MMOL/L (ref 21–32)
CREAT SERPL-MCNC: 0.9 MG/DL (ref 0.55–1.3)
CREAT SERPL-MCNC: 1.2 MG/DL (ref 0.55–1.3)
DEPRECATED RDW RBC AUTO: 14 % (ref 11.6–15.6)
EOSINOPHIL # BLD: 1.8 % (ref 0–4.5)
GLUCOSE SERPL-MCNC: 133 MG/DL (ref 74–106)
GLUCOSE SERPL-MCNC: 99 MG/DL (ref 74–106)
HCT VFR BLD CALC: 36 % (ref 32.4–45.2)
HGB BLD-MCNC: 12.4 GM/DL (ref 10.7–15.3)
LYMPHOCYTES # BLD: 8 % (ref 8–40)
MAGNESIUM SERPL-MCNC: 2.4 MG/DL (ref 1.8–2.4)
MCH RBC QN AUTO: 30.6 PG (ref 25.7–33.7)
MCHC RBC AUTO-ENTMCNC: 34.4 G/DL (ref 32–36)
MCV RBC: 89 FL (ref 80–96)
MONOCYTES # BLD AUTO: 4 % (ref 3.8–10.2)
NEUTROPHILS # BLD: 85.6 % (ref 42.8–82.8)
PLATELET # BLD AUTO: 214 K/MM3 (ref 134–434)
PMV BLD: 8.9 FL (ref 7.5–11.1)
POTASSIUM SERPLBLD-SCNC: 2.9 MMOL/L (ref 3.5–5.1)
POTASSIUM SERPLBLD-SCNC: 3.9 MMOL/L (ref 3.5–5.1)
PROT SERPL-MCNC: 6.9 G/DL (ref 6.4–8.2)
RBC # BLD AUTO: 4.04 M/MM3 (ref 3.6–5.2)
SODIUM SERPL-SCNC: 137 MMOL/L (ref 136–145)
SODIUM SERPL-SCNC: 140 MMOL/L (ref 136–145)
WBC # BLD AUTO: 10.4 K/MM3 (ref 4–10)

## 2020-11-24 PROCEDURE — C9803 HOPD COVID-19 SPEC COLLECT: HCPCS

## 2020-11-24 PROCEDURE — U0003 INFECTIOUS AGENT DETECTION BY NUCLEIC ACID (DNA OR RNA); SEVERE ACUTE RESPIRATORY SYNDROME CORONAVIRUS 2 (SARS-COV-2) (CORONAVIRUS DISEASE [COVID-19]), AMPLIFIED PROBE TECHNIQUE, MAKING USE OF HIGH THROUGHPUT TECHNOLOGIES AS DESCRIBED BY CMS-2020-01-R: HCPCS

## 2020-11-24 RX ADMIN — POTASSIUM CHLORIDE SCH MLS/HR: 7.46 INJECTION, SOLUTION INTRAVENOUS at 15:55

## 2020-11-24 RX ADMIN — POTASSIUM CHLORIDE SCH MLS/HR: 7.46 INJECTION, SOLUTION INTRAVENOUS at 17:30

## 2020-11-24 RX ADMIN — POTASSIUM CHLORIDE SCH MLS/HR: 7.46 INJECTION, SOLUTION INTRAVENOUS at 17:00

## 2020-11-25 VITALS — TEMPERATURE: 97.3 F

## 2020-11-25 VITALS — HEART RATE: 66 BPM | SYSTOLIC BLOOD PRESSURE: 128 MMHG | DIASTOLIC BLOOD PRESSURE: 75 MMHG

## 2020-11-25 LAB
ALBUMIN SERPL-MCNC: 3.8 G/DL (ref 3.4–5)
ALP SERPL-CCNC: 75 U/L (ref 45–117)
ALT SERPL-CCNC: 19 U/L (ref 13–61)
ANION GAP SERPL CALC-SCNC: 5 MMOL/L (ref 8–16)
AST SERPL-CCNC: 23 U/L (ref 15–37)
BILIRUB SERPL-MCNC: 0.7 MG/DL (ref 0.2–1)
BUN SERPL-MCNC: 5 MG/DL (ref 7–18)
CALCIUM SERPL-MCNC: 8.2 MG/DL (ref 8.5–10.1)
CHLORIDE SERPL-SCNC: 106 MMOL/L (ref 98–107)
CO2 SERPL-SCNC: 26 MMOL/L (ref 21–32)
CREAT SERPL-MCNC: 0.9 MG/DL (ref 0.55–1.3)
DEPRECATED RDW RBC AUTO: 14 % (ref 11.6–15.6)
GLUCOSE SERPL-MCNC: 86 MG/DL (ref 74–106)
HCT VFR BLD CALC: 34.6 % (ref 32.4–45.2)
HGB BLD-MCNC: 11.6 GM/DL (ref 10.7–15.3)
MAGNESIUM SERPL-MCNC: 2.5 MG/DL (ref 1.8–2.4)
MCH RBC QN AUTO: 29.8 PG (ref 25.7–33.7)
MCHC RBC AUTO-ENTMCNC: 33.5 G/DL (ref 32–36)
MCV RBC: 88.7 FL (ref 80–96)
PHOSPHATE SERPL-MCNC: 3.4 MG/DL (ref 2.5–4.9)
PLATELET # BLD AUTO: 179 K/MM3 (ref 134–434)
PMV BLD: 8.8 FL (ref 7.5–11.1)
POTASSIUM SERPLBLD-SCNC: 3.8 MMOL/L (ref 3.5–5.1)
PROT SERPL-MCNC: 6.2 G/DL (ref 6.4–8.2)
RBC # BLD AUTO: 3.9 M/MM3 (ref 3.6–5.2)
SODIUM SERPL-SCNC: 137 MMOL/L (ref 136–145)
WBC # BLD AUTO: 6.1 K/MM3 (ref 4–10)

## 2021-01-21 ENCOUNTER — HOSPITAL ENCOUNTER (EMERGENCY)
Dept: HOSPITAL 74 - JER | Age: 46
Discharge: HOME | End: 2021-01-21
Payer: SELF-PAY

## 2021-01-21 VITALS — SYSTOLIC BLOOD PRESSURE: 140 MMHG | DIASTOLIC BLOOD PRESSURE: 70 MMHG | HEART RATE: 102 BPM

## 2021-01-21 VITALS — BODY MASS INDEX: 19.1 KG/M2

## 2021-01-21 VITALS — TEMPERATURE: 97.9 F

## 2021-01-21 DIAGNOSIS — R42: ICD-10-CM

## 2021-01-21 DIAGNOSIS — F19.10: Primary | ICD-10-CM

## 2021-01-21 DIAGNOSIS — F11.20: ICD-10-CM

## 2021-01-21 LAB
ALBUMIN SERPL-MCNC: 3.5 G/DL (ref 3.4–5)
ALP SERPL-CCNC: 83 U/L (ref 45–117)
ALT SERPL-CCNC: 27 U/L (ref 13–61)
ANION GAP SERPL CALC-SCNC: 7 MMOL/L (ref 8–16)
AST SERPL-CCNC: 27 U/L (ref 15–37)
BASOPHILS # BLD: 0.8 % (ref 0–2)
BILIRUB SERPL-MCNC: 0.2 MG/DL (ref 0.2–1)
BNP SERPL-MCNC: 166.8 PG/ML (ref 5–125)
BUN SERPL-MCNC: 9.4 MG/DL (ref 7–18)
CALCIUM SERPL-MCNC: 8.9 MG/DL (ref 8.5–10.1)
CHLORIDE SERPL-SCNC: 101 MMOL/L (ref 98–107)
CO2 SERPL-SCNC: 27 MMOL/L (ref 21–32)
CREAT SERPL-MCNC: 0.8 MG/DL (ref 0.55–1.3)
DEPRECATED RDW RBC AUTO: 13.3 % (ref 11.6–15.6)
EOSINOPHIL # BLD: 3.8 % (ref 0–4.5)
GLUCOSE SERPL-MCNC: 138 MG/DL (ref 74–106)
HCT VFR BLD CALC: 31.4 % (ref 32.4–45.2)
HGB BLD-MCNC: 10.6 GM/DL (ref 10.7–15.3)
LYMPHOCYTES # BLD: 17.2 % (ref 8–40)
MAGNESIUM SERPL-MCNC: 2 MG/DL (ref 1.8–2.4)
MCH RBC QN AUTO: 29.6 PG (ref 25.7–33.7)
MCHC RBC AUTO-ENTMCNC: 33.8 G/DL (ref 32–36)
MCV RBC: 87.6 FL (ref 80–96)
MONOCYTES # BLD AUTO: 6.5 % (ref 3.8–10.2)
NEUTROPHILS # BLD: 71.7 % (ref 42.8–82.8)
PLATELET # BLD AUTO: 371 K/MM3 (ref 134–434)
PMV BLD: 9.3 FL (ref 7.5–11.1)
POTASSIUM SERPLBLD-SCNC: 3.2 MMOL/L (ref 3.5–5.1)
PROT SERPL-MCNC: 7 G/DL (ref 6.4–8.2)
RBC # BLD AUTO: 3.58 M/MM3 (ref 3.6–5.2)
SODIUM SERPL-SCNC: 135 MMOL/L (ref 136–145)
WBC # BLD AUTO: 6.1 K/MM3 (ref 4–10)

## 2021-01-21 PROCEDURE — 3E033GC INTRODUCTION OF OTHER THERAPEUTIC SUBSTANCE INTO PERIPHERAL VEIN, PERCUTANEOUS APPROACH: ICD-10-PCS

## 2024-07-09 PROBLEM — Z00.00 ENCOUNTER FOR PREVENTIVE HEALTH EXAMINATION: Status: ACTIVE | Noted: 2024-07-09

## 2024-07-15 ENCOUNTER — APPOINTMENT (OUTPATIENT)
Dept: GYNECOLOGIC ONCOLOGY | Facility: CLINIC | Age: 49
End: 2024-07-15